# Patient Record
Sex: FEMALE
[De-identification: names, ages, dates, MRNs, and addresses within clinical notes are randomized per-mention and may not be internally consistent; named-entity substitution may affect disease eponyms.]

---

## 2020-11-11 ENCOUNTER — HOSPITAL ENCOUNTER (OUTPATIENT)
Dept: HOSPITAL 88 - MAMMO | Age: 50
End: 2020-11-11
Attending: FAMILY MEDICINE
Payer: COMMERCIAL

## 2020-11-11 DIAGNOSIS — N60.12: Primary | ICD-10-CM

## 2020-11-11 DIAGNOSIS — N60.11: ICD-10-CM

## 2020-11-11 PROCEDURE — 77066 DX MAMMO INCL CAD BI: CPT

## 2020-11-12 NOTE — DIAGNOSTIC IMAGING REPORT
#[id number] - [id number]

#BILATERAL DIGITAL DIAGNOSTIC MAMMOGRAM WITH CAD: 11/11/2020

No prior exams were available for comparison.  Current study contains 12 films.  

The tissue of both breasts is heterogeneously dense. This may lower the sensitivity of mammography. 

 

Current study was also evaluated with a Computer Aided Detection (CAD) system.  

Benign appearing calcifications are noted in the left breast.  

No significant masses, calcifications, or other findings are seen in either breast.  



IMPRESSION: BENIGN

See the report for ultrasound performed the same day for additional details.  

There is no mammographic evidence of malignancy.  A 1 year screening mammogram is recommended. 

The patient will be notified by letter of the results.  





BRIGETTE ALCOCER M.D.          

ct/penrad:11/11/2020 13:52:58  



Imaging Technologist: Radha SHI)(M), [facility]

letter sent: Normal Exam  

Mammogram BI-RADS: 2 Benign

## 2020-11-12 NOTE — DIAGNOSTIC IMAGING REPORT
#[id number] - [id number]

ULTRASOUND OF THE LEFT BREAST : 11/11/2020

Comparison is made to exam dated:  11/11/2020 mammogram - [facility].  

Color flow and real-time ultrasound were performed on the left breast.  

No abnormalities were seen sonographically in the left axilla.  Scattered subcentimeter cysts are 

noted. There are no suspicious masses identified.  Dense glandular tissue is present in the area of 

indicated palpable abnormality, including a cluster of cysts at 1 o'clock.  



IMPRESSION: BENIGN 

There is no sonographic evidence of malignancy.  

A 1 year screening mammogram is recommended.



BRIGETTE ALCOCER M.D.  

ct/penrad:11/11/2020 13:57:23  



Imaging Technologist: Radha SHI)(TIFFANIE), [facility]

letter sent: Normal Exam  

Ultrasound BI-RADS: 2 Benign